# Patient Record
Sex: FEMALE | Race: WHITE
[De-identification: names, ages, dates, MRNs, and addresses within clinical notes are randomized per-mention and may not be internally consistent; named-entity substitution may affect disease eponyms.]

---

## 2017-07-05 ENCOUNTER — HOSPITAL ENCOUNTER (EMERGENCY)
Dept: HOSPITAL 10 - FTE | Age: 22
Discharge: HOME | End: 2017-07-05
Payer: MEDICAID

## 2017-07-05 VITALS
HEIGHT: 63 IN | WEIGHT: 196.21 LBS | BODY MASS INDEX: 34.77 KG/M2 | BODY MASS INDEX: 34.77 KG/M2 | WEIGHT: 196.21 LBS | HEIGHT: 63 IN

## 2017-07-05 DIAGNOSIS — S42.001A: Primary | ICD-10-CM
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
## 2017-07-05 DIAGNOSIS — S29.9XXA: ICD-10-CM

## 2017-07-05 PROCEDURE — 73030 X-RAY EXAM OF SHOULDER: CPT

## 2017-07-05 PROCEDURE — 71100 X-RAY EXAM RIBS UNI 2 VIEWS: CPT

## 2017-07-05 PROCEDURE — 71010: CPT

## 2017-07-05 PROCEDURE — 73110 X-RAY EXAM OF WRIST: CPT

## 2017-07-05 PROCEDURE — 73080 X-RAY EXAM OF ELBOW: CPT

## 2017-07-05 NOTE — RADRPT
PROCEDURE:   XR Wrist. 

 

CLINICAL INDICATION:   Pain 

 

TECHNIQUE:   AP, lateral and oblique views of the right wrist were performed. 

 

COMPARISON:   No prior studies are available for comparison. 

 

FINDINGS:

3 views of the right wrist demonstrate no displaced fracture.  Carpal bones are normally aligned.  T
here is no significant degenerate change.  No bony erosions are seen.  The bones normally mineralize
d.  Soft tissues are unremarkable.

 

IMPRESSION:

 

1.  No acute fracture dislocation

 

RPTAT: HH

_____________________________________________ 

.Mat Thomas MD, MD           Date    Time 

Electronically viewed and signed by .Mat Thomas MD, MD on 07/05/2017 17:05 

 

D:  07/05/2017 17:05  T:  07/05/2017 17:05

.W/

## 2017-07-05 NOTE — RADRPT
PROCEDURE:   Shoulder x-ray

 

CLINICAL INDICATION:   Pain 

 

TECHNIQUE:   Right shoulder 3 views 

 

COMPARISON:   None 

 

FINDINGS:

3 views of the right shoulder demonstrate in mid clavicle fracture with minimal angulation.  No othe
r fractures are identified.  The humeral head articulates anatomically with the glenoid fossa.  The 
acromioclavicular articulation is within normal limits.  Bones are normally mineralized.  Soft tissu
es are unremarkable. 

 

IMPRESSION:

 

 

1.  Right mid clavicle fracture with minimal angulation.  The apex is directed superiorly.

2.  No other fracture dislocation.

 

RPTAT: HH

_____________________________________________ 

.Mat Thomas MD, MD           Date    Time 

Electronically viewed and signed by .Mat Thomas MD, MD on 07/05/2017 16:58 

 

D:  07/05/2017 16:58  T:  07/05/2017 16:58

.W/

## 2017-07-05 NOTE — RADRPT
PROCEDURE:   XR Elbow. 

 

CLINICAL INDICATION:   Pain 

 

TECHNIQUE:   AP, lateral and oblique  views of the right elbow performed. 

 

COMPARISON:   None. 

 

FINDINGS:

Multiple views of the right elbow demonstrate no displaced fracture.  No gross malalignment is seen.
  There is no significant degenerate change.  No elbow joint effusion is identified.  Bones are norm
ally mineralized.  Soft tissues are unremarkable.

 

IMPRESSION:

No acute fracture dislocation

 

 

RPTAT: HH

_____________________________________________ 

.Mat Thomas MD, MD           Date    Time 

Electronically viewed and signed by .Mat Thomas MD, MD on 07/05/2017 17:04 

 

D:  07/05/2017 17:04  T:  07/05/2017 17:04

.W/
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
## 2017-07-05 NOTE — RADRPT
PROCEDURE:   XR Ribs. 

 

CLINICAL INDICATION:   Pain 

 

TECHNIQUE:   Multiple oblique views of the right ribs were obtained.  The images were reviewed on a 
PACS workstation. 

 

COMPARISON:   None. 

 

FINDINGS:

Rib series demonstrates no displaced rib fracture.  No pneumothorax is seen.  The underlying lung is
 clear.

 

IMPRESSION:

No displaced rib fracture identified

Incidental right mid clavicle fracture.

RPTAT: HH

_____________________________________________ 

.Mat Thomas MD, MD           Date    Time 

Electronically viewed and signed by .Mat Thomas MD, MD on 07/05/2017 17:03 

 

D:  07/05/2017 17:03  T:  07/05/2017 17:03

.W/

## 2017-07-05 NOTE — RADRPT
AMENDMENT:

7/5/2017 4:59:48 PM Mat Thomas MD

Addendum:  There is right mid clavicle fracture with minimal angulation.  No pneumothorax is seen

 

 

PROCEDURE:   Chest x-ray 

 

CLINICAL INDICATION:   Trauma

 

TECHNIQUE:   Chest single view

 

COMPARISON:   None 

 

FINDINGS:

The heart is normal in size.  The pulmonary vessels are normal in caliber.  The lungs are clear.  Th
e costophrenic angles are sharp.  The visualized bony thorax is unremarkable. 

 

IMPRESSION:

 

No acute cardiopulmonary disease. 

 

 

RPTAT: HH

_____________________________________________ 

.Mat Thomas MD, MD           Date    Time 

Electronically viewed and signed by .Mat Thomas MD, MD on 07/05/2017 16:59 

 

D:  07/05/2017 16:59  T:  07/05/2017 16:59

.W/

## 2018-08-11 ENCOUNTER — HOSPITAL ENCOUNTER (OUTPATIENT)
Dept: HOSPITAL 91 - OBT | Age: 23
LOS: 1 days | Discharge: HOME | End: 2018-08-12
Payer: COMMERCIAL

## 2018-08-11 ENCOUNTER — HOSPITAL ENCOUNTER (OUTPATIENT)
Age: 23
LOS: 1 days | Discharge: HOME | End: 2018-08-12

## 2018-08-11 DIAGNOSIS — Z3A.21: ICD-10-CM

## 2018-08-11 PROCEDURE — 76817 TRANSVAGINAL US OBSTETRIC: CPT

## 2018-08-11 PROCEDURE — 96360 HYDRATION IV INFUSION INIT: CPT

## 2018-08-11 PROCEDURE — 36415 COLL VENOUS BLD VENIPUNCTURE: CPT

## 2018-08-11 PROCEDURE — 81003 URINALYSIS AUTO W/O SCOPE: CPT

## 2018-08-11 PROCEDURE — 81001 URINALYSIS AUTO W/SCOPE: CPT

## 2018-08-11 PROCEDURE — 76815 OB US LIMITED FETUS(S): CPT

## 2018-08-11 PROCEDURE — 96361 HYDRATE IV INFUSION ADD-ON: CPT

## 2018-08-12 LAB
ADD UMIC: NO
UR AMORPHOUS CRYSTAL: (no result) /HPF
UR ASCORBIC ACID: NEGATIVE MG/DL
UR BACTERIA: (no result) /HPF
UR BILIRUBIN (DIP): NEGATIVE MG/DL
UR BLOOD (DIP): NEGATIVE MG/DL
UR CLARITY: (no result)
UR COLOR: (no result)
UR GLUCOSE (DIP): NEGATIVE MG/DL
UR KETONES (DIP): NEGATIVE MG/DL
UR LEUKOCYTE ESTERASE (DIP): NEGATIVE LEU/UL
UR NITRITE (DIP): NEGATIVE MG/DL
UR PH (DIP): 7 (ref 5–9)
UR RBC: 0 /HPF (ref 0–5)
UR SPECIFIC GRAVITY (DIP): 1.01 (ref 1–1.03)
UR SQUAMOUS EPITHELIAL CELL: (no result) /HPF
UR TOTAL PROTEIN (DIP): NEGATIVE MG/DL
UR UROBILINOGEN (DIP): NEGATIVE MG/DL
UR WBC: 0 /HPF (ref 0–5)

## 2018-08-12 RX ADMIN — PYRIDOXINE HYDROCHLORIDE 1 MLS/HR: 100 INJECTION, SOLUTION INTRAMUSCULAR; INTRAVENOUS at 00:43

## 2018-08-12 RX ADMIN — PYRIDOXINE HYDROCHLORIDE 1 MLS/HR: 100 INJECTION, SOLUTION INTRAMUSCULAR; INTRAVENOUS at 03:16

## 2018-11-26 ENCOUNTER — HOSPITAL ENCOUNTER (INPATIENT)
Dept: HOSPITAL 91 - OBT | Age: 23
LOS: 2 days | Discharge: HOME | DRG: 833 | End: 2018-11-28
Payer: COMMERCIAL

## 2018-11-26 ENCOUNTER — HOSPITAL ENCOUNTER (INPATIENT)
Age: 23
LOS: 2 days | Discharge: HOME | DRG: 833 | End: 2018-11-28

## 2018-11-26 DIAGNOSIS — Z3A.36: ICD-10-CM

## 2018-11-26 DIAGNOSIS — O34.219: Primary | ICD-10-CM

## 2018-11-26 LAB
ADD MAN DIFF?: NO
ADD UMIC: YES
BASOPHIL #: 0 10^3/UL (ref 0–0.1)
BASOPHILS %: 0.3 % (ref 0–2)
EOSINOPHILS #: 0.1 10^3/UL (ref 0–0.5)
EOSINOPHILS %: 0.6 % (ref 0–7)
HEMATOCRIT: 32.9 % (ref 37–47)
HEMOGLOBIN: 10.5 G/DL (ref 12–16)
HEPATITIS B SURFACE ANTIGEN: NEGATIVE
IMMATURE GRANS #M: 0.07 10^3/UL (ref 0–0.03)
IMMATURE GRANS % (M): 0.7 % (ref 0–0.43)
INR: 0.93
LYMPHOCYTES #: 1.9 10^3/UL (ref 0.8–2.9)
LYMPHOCYTES %: 20.1 % (ref 15–51)
MEAN CORPUSCULAR HEMOGLOBIN: 26.1 PG (ref 29–33)
MEAN CORPUSCULAR HGB CONC: 31.9 G/DL (ref 32–37)
MEAN CORPUSCULAR VOLUME: 81.6 FL (ref 82–101)
MEAN PLATELET VOLUME: 9.3 FL (ref 7.4–10.4)
MONOCYTE #: 0.9 10^3/UL (ref 0.3–0.9)
MONOCYTES %: 9.5 % (ref 0–11)
NEUTROPHIL #: 6.6 10^3/UL (ref 1.6–7.5)
NEUTROPHILS %: 68.8 % (ref 39–77)
NUCLEATED RED BLOOD CELLS #: 0 10^3/UL (ref 0–0)
NUCLEATED RED BLOOD CELLS%: 0 /100WBC (ref 0–0)
PARTIAL THROMBOPLASTIN TIME: 27.2 SEC (ref 23–35)
PLATELET COUNT: 301 10^3/UL (ref 140–415)
PROTIME: 12.6 SEC (ref 11.9–14.9)
PT RATIO: 1
RED BLOOD COUNT: 4.03 10^6/UL (ref 4.2–5.4)
RED CELL DISTRIBUTION WIDTH: 15.2 % (ref 11.5–14.5)
UR AMORPHOUS CRYSTAL: (no result) /HPF
UR ASCORBIC ACID: NEGATIVE MG/DL
UR BACTERIA: (no result) /HPF
UR BILIRUBIN (DIP): NEGATIVE MG/DL
UR BLOOD (DIP): NEGATIVE MG/DL
UR CLARITY: (no result)
UR COLOR: YELLOW
UR GLUCOSE (DIP): NEGATIVE MG/DL
UR KETONES (DIP): NEGATIVE MG/DL
UR LEUKOCYTE ESTERASE (DIP): NEGATIVE LEU/UL
UR MUCUS: (no result) /HPF
UR NITRITE (DIP): NEGATIVE MG/DL
UR PH (DIP): 6 (ref 5–9)
UR RBC: 2 /HPF (ref 0–5)
UR SPECIFIC GRAVITY (DIP): 1.02 (ref 1–1.03)
UR SQUAMOUS EPITHELIAL CELL: (no result) /HPF
UR TOTAL PROTEIN (DIP): NEGATIVE MG/DL
UR UROBILINOGEN (DIP): NEGATIVE MG/DL
UR WBC: 4 /HPF (ref 0–5)
WHITE BLOOD COUNT: 9.6 10^3/UL (ref 4.8–10.8)

## 2018-11-26 PROCEDURE — 87340 HEPATITIS B SURFACE AG IA: CPT

## 2018-11-26 PROCEDURE — 86592 SYPHILIS TEST NON-TREP QUAL: CPT

## 2018-11-26 PROCEDURE — 86850 RBC ANTIBODY SCREEN: CPT

## 2018-11-26 PROCEDURE — 81001 URINALYSIS AUTO W/SCOPE: CPT

## 2018-11-26 PROCEDURE — 85610 PROTHROMBIN TIME: CPT

## 2018-11-26 PROCEDURE — 86901 BLOOD TYPING SEROLOGIC RH(D): CPT

## 2018-11-26 PROCEDURE — 86900 BLOOD TYPING SEROLOGIC ABO: CPT

## 2018-11-26 PROCEDURE — 85730 THROMBOPLASTIN TIME PARTIAL: CPT

## 2018-11-26 PROCEDURE — 85025 COMPLETE CBC W/AUTO DIFF WBC: CPT

## 2018-11-26 RX ADMIN — TERBUTALINE SULFATE 1 MG: 1 INJECTION SUBCUTANEOUS at 20:42

## 2018-11-26 RX ADMIN — PYRIDOXINE HYDROCHLORIDE 1 MLS/HR: 100 INJECTION, SOLUTION INTRAMUSCULAR; INTRAVENOUS at 20:42

## 2018-11-26 RX ADMIN — PYRIDOXINE HYDROCHLORIDE 1 MLS/HR: 100 INJECTION, SOLUTION INTRAMUSCULAR; INTRAVENOUS at 19:02

## 2018-11-27 LAB — RAPID PLASMA REAGIN: NONREACTIVE

## 2018-11-27 RX ADMIN — BETAMETHASONE SODIUM PHOSPHATE AND BETAMETHASONE ACETATE 1 MG: 3; 3 INJECTION, SUSPENSION INTRA-ARTICULAR; INTRALESIONAL; INTRAMUSCULAR at 23:25

## 2018-11-27 RX ADMIN — OXYTOCIN 1 MLS/HR: 10 INJECTION, SOLUTION INTRAMUSCULAR; INTRAVENOUS at 10:17

## 2018-11-27 RX ADMIN — PYRIDOXINE HYDROCHLORIDE 1 MLS/HR: 100 INJECTION, SOLUTION INTRAMUSCULAR; INTRAVENOUS at 19:44

## 2018-11-27 RX ADMIN — CEFAZOLIN SODIUM 1 MLS/HR: 2 SOLUTION INTRAVENOUS at 23:24

## 2018-11-27 RX ADMIN — TERBUTALINE SULFATE 1 MG: 1 INJECTION SUBCUTANEOUS at 00:48

## 2018-11-27 RX ADMIN — PYRIDOXINE HYDROCHLORIDE 1 MLS/HR: 100 INJECTION, SOLUTION INTRAMUSCULAR; INTRAVENOUS at 02:45

## 2018-11-27 RX ADMIN — PYRIDOXINE HYDROCHLORIDE 1 MLS/HR: 100 INJECTION, SOLUTION INTRAMUSCULAR; INTRAVENOUS at 09:21

## 2018-11-28 RX ADMIN — CEFAZOLIN SODIUM 1 MLS/HR: 2 SOLUTION INTRAVENOUS at 07:31

## 2018-11-28 RX ADMIN — PYRIDOXINE HYDROCHLORIDE 1 MLS/HR: 100 INJECTION, SOLUTION INTRAMUSCULAR; INTRAVENOUS at 04:48

## 2018-11-28 RX ADMIN — CEFAZOLIN SODIUM 1 MLS/HR: 2 SOLUTION INTRAVENOUS at 07:27

## 2018-11-28 RX ADMIN — CEFAZOLIN SODIUM 1 MLS/HR: 2 SOLUTION INTRAVENOUS at 06:42

## 2018-11-28 RX ADMIN — BETAMETHASONE SODIUM PHOSPHATE AND BETAMETHASONE ACETATE 1 MG: 3; 3 INJECTION, SUSPENSION INTRA-ARTICULAR; INTRALESIONAL; INTRAMUSCULAR at 11:07

## 2018-12-15 ENCOUNTER — HOSPITAL ENCOUNTER (INPATIENT)
Age: 23
LOS: 3 days | Discharge: HOME | End: 2018-12-18

## 2018-12-15 ENCOUNTER — HOSPITAL ENCOUNTER (INPATIENT)
Dept: HOSPITAL 91 - L-D | Age: 23
LOS: 3 days | Discharge: HOME | End: 2018-12-18
Payer: COMMERCIAL

## 2018-12-15 DIAGNOSIS — Z23: ICD-10-CM

## 2018-12-15 DIAGNOSIS — O34.219: Primary | ICD-10-CM

## 2018-12-15 DIAGNOSIS — Z3A.39: ICD-10-CM

## 2018-12-15 LAB
ADD MAN DIFF?: NO
BASOPHIL #: 0 10^3/UL (ref 0–0.1)
BASOPHILS %: 0.4 % (ref 0–2)
EOSINOPHILS #: 0.1 10^3/UL (ref 0–0.5)
EOSINOPHILS %: 0.6 % (ref 0–7)
HEMATOCRIT: 34.6 % (ref 37–47)
HEMOGLOBIN: 11 G/DL (ref 12–16)
IMMATURE GRANS #M: 0.08 10^3/UL (ref 0–0.03)
IMMATURE GRANS % (M): 0.8 % (ref 0–0.43)
INR: 0.89
LYMPHOCYTES #: 1.9 10^3/UL (ref 0.8–2.9)
LYMPHOCYTES %: 19 % (ref 15–51)
MEAN CORPUSCULAR HEMOGLOBIN: 25.7 PG (ref 29–33)
MEAN CORPUSCULAR HGB CONC: 31.8 G/DL (ref 32–37)
MEAN CORPUSCULAR VOLUME: 80.8 FL (ref 82–101)
MEAN PLATELET VOLUME: 9 FL (ref 7.4–10.4)
MONOCYTE #: 0.8 10^3/UL (ref 0.3–0.9)
MONOCYTES %: 8.1 % (ref 0–11)
NEUTROPHIL #: 7.2 10^3/UL (ref 1.6–7.5)
NEUTROPHILS %: 71.1 % (ref 39–77)
NUCLEATED RED BLOOD CELLS #: 0 10^3/UL (ref 0–0)
NUCLEATED RED BLOOD CELLS%: 0 /100WBC (ref 0–0)
PARTIAL THROMBOPLASTIN TIME: 27.6 SEC (ref 23–35)
PLATELET COUNT: 285 10^3/UL (ref 140–415)
PROTIME: 12.1 SEC (ref 11.9–14.9)
PT RATIO: 0.9
RAPID PLASMA REAGIN: NONREACTIVE
RED BLOOD COUNT: 4.28 10^6/UL (ref 4.2–5.4)
RED CELL DISTRIBUTION WIDTH: 15.8 % (ref 11.5–14.5)
WHITE BLOOD COUNT: 10.1 10^3/UL (ref 4.8–10.8)

## 2018-12-15 PROCEDURE — 86901 BLOOD TYPING SEROLOGIC RH(D): CPT

## 2018-12-15 PROCEDURE — 86850 RBC ANTIBODY SCREEN: CPT

## 2018-12-15 PROCEDURE — 90686 IIV4 VACC NO PRSV 0.5 ML IM: CPT

## 2018-12-15 PROCEDURE — 85025 COMPLETE CBC W/AUTO DIFF WBC: CPT

## 2018-12-15 PROCEDURE — 86592 SYPHILIS TEST NON-TREP QUAL: CPT

## 2018-12-15 PROCEDURE — 85610 PROTHROMBIN TIME: CPT

## 2018-12-15 PROCEDURE — 86900 BLOOD TYPING SEROLOGIC ABO: CPT

## 2018-12-15 PROCEDURE — 85730 THROMBOPLASTIN TIME PARTIAL: CPT

## 2018-12-15 PROCEDURE — 90715 TDAP VACCINE 7 YRS/> IM: CPT

## 2018-12-15 RX ADMIN — Medication 1 MLS/HR: at 15:54

## 2018-12-15 RX ADMIN — IBUPROFEN 1 MG: 800 TABLET, FILM COATED ORAL at 14:00

## 2018-12-15 RX ADMIN — ONDANSETRON HYDROCHLORIDE 1 MG: 2 INJECTION, SOLUTION INTRAMUSCULAR; INTRAVENOUS at 09:20

## 2018-12-15 RX ADMIN — IBUPROFEN 1 MG: 800 TABLET, FILM COATED ORAL at 22:00

## 2018-12-15 RX ADMIN — SODIUM CITRATE AND CITRIC ACID MONOHYDRATE 1 ML: 500; 334 SOLUTION ORAL at 10:30

## 2018-12-15 RX ADMIN — SODIUM CITRATE AND CITRIC ACID MONOHYDRATE 1 ML: 500; 334 SOLUTION ORAL at 09:20

## 2018-12-15 RX ADMIN — PYRIDOXINE HYDROCHLORIDE 1 MLS/HR: 100 INJECTION, SOLUTION INTRAMUSCULAR; INTRAVENOUS at 08:28

## 2018-12-15 RX ADMIN — DOCUSATE SODIUM AND SENNOSIDES 1 TAB: 8.6; 5 TABLET, FILM COATED ORAL at 21:00

## 2018-12-15 RX ADMIN — ONDANSETRON HYDROCHLORIDE 1 MG: 2 INJECTION, SOLUTION INTRAMUSCULAR; INTRAVENOUS at 15:52

## 2018-12-15 RX ADMIN — CEFAZOLIN SODIUM 1 MLS/HR: 2 SOLUTION INTRAVENOUS at 11:42

## 2018-12-15 RX ADMIN — Medication 1 MLS/HR: at 12:10

## 2018-12-15 RX ADMIN — PYRIDOXINE HYDROCHLORIDE 1 MLS/HR: 100 INJECTION, SOLUTION INTRAMUSCULAR; INTRAVENOUS at 09:03

## 2018-12-15 RX ADMIN — ENOXAPARIN SODIUM 1 MG: 100 INJECTION SUBCUTANEOUS at 14:44

## 2018-12-15 RX ADMIN — ONDANSETRON HYDROCHLORIDE 1 MG: 2 INJECTION, SOLUTION INTRAMUSCULAR; INTRAVENOUS at 10:30

## 2018-12-16 LAB
ADD MAN DIFF?: NO
BASOPHIL #: 0 10^3/UL (ref 0–0.1)
BASOPHILS %: 0.3 % (ref 0–2)
EOSINOPHILS #: 0 10^3/UL (ref 0–0.5)
EOSINOPHILS %: 0.2 % (ref 0–7)
HEMATOCRIT: 29.1 % (ref 37–47)
HEMOGLOBIN: 9.3 G/DL (ref 12–16)
IMMATURE GRANS #M: 0.08 10^3/UL (ref 0–0.03)
IMMATURE GRANS % (M): 0.6 % (ref 0–0.43)
LYMPHOCYTES #: 2.6 10^3/UL (ref 0.8–2.9)
LYMPHOCYTES %: 19.2 % (ref 15–51)
MEAN CORPUSCULAR HEMOGLOBIN: 25.7 PG (ref 29–33)
MEAN CORPUSCULAR HGB CONC: 32 G/DL (ref 32–37)
MEAN CORPUSCULAR VOLUME: 80.4 FL (ref 82–101)
MEAN PLATELET VOLUME: 9.1 FL (ref 7.4–10.4)
MONOCYTE #: 1.3 10^3/UL (ref 0.3–0.9)
MONOCYTES %: 9.2 % (ref 0–11)
NEUTROPHIL #: 9.6 10^3/UL (ref 1.6–7.5)
NEUTROPHILS %: 70.5 % (ref 39–77)
NUCLEATED RED BLOOD CELLS #: 0 10^3/UL (ref 0–0)
NUCLEATED RED BLOOD CELLS%: 0 /100WBC (ref 0–0)
PLATELET COUNT: 235 10^3/UL (ref 140–415)
RED BLOOD COUNT: 3.62 10^6/UL (ref 4.2–5.4)
RED CELL DISTRIBUTION WIDTH: 15.9 % (ref 11.5–14.5)
WHITE BLOOD COUNT: 13.6 10^3/UL (ref 4.8–10.8)

## 2018-12-16 RX ADMIN — IBUPROFEN 1 MG: 800 TABLET, FILM COATED ORAL at 22:07

## 2018-12-16 RX ADMIN — HYDROCODONE BITARTRATE AND ACETAMINOPHEN 1 TAB: 5; 325 TABLET ORAL at 22:06

## 2018-12-16 RX ADMIN — IBUPROFEN 1 MG: 800 TABLET, FILM COATED ORAL at 14:50

## 2018-12-16 RX ADMIN — PYRIDOXINE HYDROCHLORIDE 1 MLS/HR: 100 INJECTION, SOLUTION INTRAMUSCULAR; INTRAVENOUS at 01:46

## 2018-12-16 RX ADMIN — DOCUSATE SODIUM AND SENNOSIDES 1 TAB: 8.6; 5 TABLET, FILM COATED ORAL at 22:06

## 2018-12-16 RX ADMIN — HYDROCODONE BITARTRATE AND ACETAMINOPHEN 1 TAB: 5; 325 TABLET ORAL at 14:50

## 2018-12-16 RX ADMIN — ONDANSETRON HYDROCHLORIDE 1 MG: 2 INJECTION, SOLUTION INTRAMUSCULAR; INTRAVENOUS at 00:14

## 2018-12-16 RX ADMIN — KETOROLAC TROMETHAMINE 1 MG: 30 INJECTION, SOLUTION INTRAMUSCULAR at 08:10

## 2018-12-16 RX ADMIN — DOCUSATE SODIUM AND SENNOSIDES 1 TAB: 8.6; 5 TABLET, FILM COATED ORAL at 08:08

## 2018-12-16 RX ADMIN — IBUPROFEN 1 MG: 800 TABLET, FILM COATED ORAL at 06:00

## 2018-12-17 RX ADMIN — HYDROCODONE BITARTRATE AND ACETAMINOPHEN 1 TAB: 5; 325 TABLET ORAL at 05:44

## 2018-12-17 RX ADMIN — HYDROCODONE BITARTRATE AND ACETAMINOPHEN 1 TAB: 5; 325 TABLET ORAL at 21:39

## 2018-12-17 RX ADMIN — HYDROCODONE BITARTRATE AND ACETAMINOPHEN 1 TAB: 5; 325 TABLET ORAL at 13:42

## 2018-12-17 RX ADMIN — IBUPROFEN 1 MG: 800 TABLET, FILM COATED ORAL at 21:39

## 2018-12-17 RX ADMIN — DOCUSATE SODIUM AND SENNOSIDES 1 TAB: 8.6; 5 TABLET, FILM COATED ORAL at 21:39

## 2018-12-17 RX ADMIN — Medication 1 APPLIC: at 10:31

## 2018-12-17 RX ADMIN — DOCUSATE SODIUM AND SENNOSIDES 1 TAB: 8.6; 5 TABLET, FILM COATED ORAL at 09:58

## 2018-12-17 RX ADMIN — IBUPROFEN 1 MG: 800 TABLET, FILM COATED ORAL at 13:41

## 2018-12-17 RX ADMIN — IBUPROFEN 1 MG: 800 TABLET, FILM COATED ORAL at 05:44

## 2018-12-18 RX ADMIN — IBUPROFEN 1 MG: 800 TABLET, FILM COATED ORAL at 13:19

## 2018-12-18 RX ADMIN — HYDROCODONE BITARTRATE AND ACETAMINOPHEN 1 TAB: 5; 325 TABLET ORAL at 04:20

## 2018-12-18 RX ADMIN — IBUPROFEN 1 MG: 800 TABLET, FILM COATED ORAL at 05:58

## 2018-12-18 RX ADMIN — HYDROCODONE BITARTRATE AND ACETAMINOPHEN 1 TAB: 5; 325 TABLET ORAL at 14:00

## 2018-12-18 RX ADMIN — DOCUSATE SODIUM AND SENNOSIDES 1 TAB: 8.6; 5 TABLET, FILM COATED ORAL at 09:51

## 2018-12-18 RX ADMIN — CLOSTRIDIUM TETANI TOXOID ANTIGEN (FORMALDEHYDE INACTIVATED), CORYNEBACTERIUM DIPHTHERIAE TOXOID ANTIGEN (FORMALDEHYDE INACTIVATED), BORDETELLA PERTUSSIS TOXOID ANTIGEN (GLUTARALDEHYDE INACTIVATED), BORDETELLA PERTUSSIS FILAMENTOUS HEMAGGLUTININ ANTIGEN (FORMALDEHYDE INACTIVATED), BORDETELLA PERTUSSIS PERTACTIN ANTIGEN, AND BORDETELLA PERTUSSIS FIMBRIAE 2/3 ANTIGEN 1 ML: 5; 2; 2.5; 5; 3; 5 INJECTION, SUSPENSION INTRAMUSCULAR at 09:52

## 2018-12-18 RX ADMIN — HYDROCODONE BITARTRATE AND ACETAMINOPHEN 1 TAB: 5; 325 TABLET ORAL at 05:58
